# Patient Record
Sex: MALE
[De-identification: names, ages, dates, MRNs, and addresses within clinical notes are randomized per-mention and may not be internally consistent; named-entity substitution may affect disease eponyms.]

---

## 2018-09-01 ENCOUNTER — HOSPITAL ENCOUNTER (EMERGENCY)
Dept: HOSPITAL 97 - ER | Age: 28
LOS: 1 days | Discharge: HOME | End: 2018-09-02
Payer: SELF-PAY

## 2018-09-01 DIAGNOSIS — K29.71: Primary | ICD-10-CM

## 2018-09-01 DIAGNOSIS — Z88.1: ICD-10-CM

## 2018-09-01 LAB
HCT VFR BLD CALC: 45.7 % (ref 39.6–49)
INR BLD: 0.95
LYMPHOCYTES # SPEC AUTO: 1.3 K/UL (ref 0.7–4.9)
MCH RBC QN AUTO: 27.2 PG (ref 27–35)
MCV RBC: 80.5 FL (ref 80–100)
PMV BLD: 11.2 FL (ref 7.6–11.3)
RBC # BLD: 5.68 M/UL (ref 4.33–5.43)

## 2018-09-01 PROCEDURE — 80329 ANALGESICS NON-OPIOID 1 OR 2: CPT

## 2018-09-01 PROCEDURE — 83690 ASSAY OF LIPASE: CPT

## 2018-09-01 PROCEDURE — 36415 COLL VENOUS BLD VENIPUNCTURE: CPT

## 2018-09-01 PROCEDURE — 81015 MICROSCOPIC EXAM OF URINE: CPT

## 2018-09-01 PROCEDURE — 80076 HEPATIC FUNCTION PANEL: CPT

## 2018-09-01 PROCEDURE — 80048 BASIC METABOLIC PNL TOTAL CA: CPT

## 2018-09-01 PROCEDURE — 99284 EMERGENCY DEPT VISIT MOD MDM: CPT

## 2018-09-01 PROCEDURE — 80320 DRUG SCREEN QUANTALCOHOLS: CPT

## 2018-09-01 PROCEDURE — 82150 ASSAY OF AMYLASE: CPT

## 2018-09-01 PROCEDURE — 85025 COMPLETE CBC W/AUTO DIFF WBC: CPT

## 2018-09-01 PROCEDURE — 85610 PROTHROMBIN TIME: CPT

## 2018-09-01 PROCEDURE — 93005 ELECTROCARDIOGRAM TRACING: CPT

## 2018-09-01 PROCEDURE — 81003 URINALYSIS AUTO W/O SCOPE: CPT

## 2018-09-01 PROCEDURE — 96374 THER/PROPH/DIAG INJ IV PUSH: CPT

## 2018-09-01 PROCEDURE — 80307 DRUG TEST PRSMV CHEM ANLYZR: CPT

## 2018-09-01 PROCEDURE — 85730 THROMBOPLASTIN TIME PARTIAL: CPT

## 2018-09-02 VITALS — TEMPERATURE: 98.5 F | OXYGEN SATURATION: 98 %

## 2018-09-02 VITALS — SYSTOLIC BLOOD PRESSURE: 134 MMHG | DIASTOLIC BLOOD PRESSURE: 81 MMHG

## 2018-09-02 LAB
ALBUMIN SERPL BCP-MCNC: 3.9 G/DL (ref 3.4–5)
ALP SERPL-CCNC: 108 U/L (ref 45–117)
ALT SERPL W P-5'-P-CCNC: 31 U/L (ref 12–78)
AMYLASE SERPL-CCNC: 72 U/L (ref 25–115)
AST SERPL W P-5'-P-CCNC: 20 U/L (ref 15–37)
BUN BLD-MCNC: 14 MG/DL (ref 7–18)
GLUCOSE SERPLBLD-MCNC: 128 MG/DL (ref 74–106)
LIPASE SERPL-CCNC: 200 U/L (ref 73–393)
METHAMPHET UR QL SCN: NEGATIVE
POTASSIUM SERPL-SCNC: 3.7 MMOL/L (ref 3.5–5.1)
THC SERPL-MCNC: POSITIVE NG/ML
UA COMPLETE W REFLEX CULTURE PNL UR: (no result)

## 2018-09-02 NOTE — ER
Nurse's Notes                                                                                     

 [facility]                                                                

Name: Max Westbrook                                                                                   

Age: 28 yrs                                                                                       

Sex: Male                                                                                         

: 1990                                                                                   

MRN: [id number]                                                                                   

Arrival Date: 2018                                                                          

Time: 23:06                                                                                       

Account#: [id number]                                                                            

Bed 3                                                                                             

Private MD:                                                                                       

Diagnosis: Gastritis, unspecified, with bleeding                                                  

                                                                                                  

Presentation:                                                                                     

                                                                                             

23:08 Presenting complaint: Presenting complaint: EMS states: Pt reports doing cocaine and    tl2 

      marijuana tonight, was arrested and began vomiting after brought into custody. EMS          

      reports that emesis was bright red. 12.5 mg Phenergan given per EMS, Pt denies nausea       

      at this time.                                                                               

23:10 Transition of care: patient was not received from another setting of care. Onset of     tl2 

      symptoms was 2018 at 22:30. Risk Assessment: Do you want to hurt yourself     

      or someone else? Patient reports no desire to harm self or others. Initial Sepsis           

      Screen: Does the patient meet any 2 criteria? No. Patient's initial sepsis screen is        

      negative. Does the patient have a suspected source of infection? No. Patient's initial      

      sepsis screen is negative. Care prior to arrival: Medication(s) given: Phenergan, 12.5      

      mg, IV initiated. 20 GA, in the right antecubital area.                                     

23:10 Method Of Arrival: EMS: [facility]                                                tl2 

23:10 Acuity: EMMETT 3                                                                           tl2 

                                                                                                  

Triage Assessment:                                                                                

23:12 General: Appears in no apparent distress. comfortable, Behavior is calm, cooperative,   tl2 

      appropriate for age. Pain: Denies pain. Neuro: Level of Consciousness is awake, alert,      

      obeys commands, Oriented to person, place, time, situation. Cardiovascular: Denies          

      chest pain. Respiratory: Airway is patent Respiratory effort is even, unlabored,            

      Respiratory pattern is regular, symmetrical. GI: Reports nausea, vomiting. : No signs     

      and/or symptoms were reported regarding the genitourinary system. Derm: Skin is pink,       

      warm \T\ dry.                                                                               

                                                                                                  

Historical:                                                                                       

- Allergies:                                                                                      

23:12 Augmentin;                                                                              tl2 

- Home Meds:                                                                                      

23:12 None [Active];                                                                          tl2 

- PMHx:                                                                                           

23:12 drug induced seizures;                                                                  tl2 

                                                                                                  

- Immunization history:: Adult Immunizations up to date.                                          

- Social history:: Smoking status: Patient/guardian denies using tobacco, Patient uses            

  alcohol, on a daily basis. street drugs, cocaine, marijuana.                                    

- Ebola Screening: : No symptoms or risks identified at this time.                                

                                                                                                  

                                                                                                  

Screenin:14 Abuse screen: Denies threats or abuse. Nutritional screening: No deficits noted.        tl2 

      Tuberculosis screening: No symptoms or risk factors identified. Fall Risk None              

      identified.                                                                                 

                                                                                                  

Assessment:                                                                                       

23:30 General: see triage assessment.                                                         tl2 

                                                                                             

00:57 Reassessment: Patient appears in no apparent distress at this time. Patient and/or      tl2 

      family updated on plan of care and expected duration. Pain level reassessed. Patient is     

      alert, oriented x 3, equal unlabored respirations, skin warm/dry/pink. Awaiting dispo,      

      Pt continues to deny any nausea. No vomiting since before arrival.                          

01:36 Reassessment: Patient appears in no apparent distress at this time. Patient and/or      tl2 

      family updated on plan of care and expected duration. Pain level reassessed. Patient is     

      alert, oriented x 3, equal unlabored respirations, skin warm/dry/pink. Pt verbalized        

      understanding of discharge instructions, need for follow up and prescription usage. Pt      

      escorted out of ER by [facility].                                                                 

                                                                                                  

Vital Signs:                                                                                      

                                                                                             

23:12  / 94; Pulse 108; Resp 20; Temp 98.5(O); Pulse Ox 98% on R/A; Weight 113.4 kg;    tl2 

      Height 5 ft. 9 in. (175.26 cm); Pain 0/10;                                                  

                                                                                             

00:09  / 87; Pulse 82; Resp 18; Pulse Ox 98% on R/A;                                    tl2 

00:57  / 90; Pulse 78; Resp 18; Pulse Ox 98% on R/A;                                    tl2 

01:06  / 81; Pulse 78; Resp 16; Pulse Ox 98% on R/A;                                    tl1 

                                                                                             

23:12 Body Mass Index 36.92 (113.40 kg, 175.26 cm)                                            tl2 

                                                                                                  

ED Course:                                                                                        

                                                                                             

23:06 Patient arrived in ED.                                                                  sb2 

23:07 Willem Kenyon MD is Attending Physician.                                            tw4 

23:11 Triage completed.                                                                       tl2 

23:12 Arm band placed on right wrist.                                                         tl2 

23:14 Patient has correct armband on for positive identification. Bed in low position. Call   tl2 

      light in reach. Side rails up X2. Security at bedside.                                      

23:14 Maintain EMS IV. Dressing intact. Good blood return noted. Site clean \T\ dry. Gauge \T\    tl
2

      site: 20 g R AC.                                                                            

                                                                                             

01:36 No provider procedures requiring assistance completed. IV discontinued, intact,         tl2 

      bleeding controlled, No redness/swelling at site. Pressure dressing applied.                

                                                                                                  

Administered Medications:                                                                         

                                                                                             

23:30 Drug: ProTONIX 40 mg Route: IVP; Site: right antecubital;                               tl2 

                                                                                             

01:37 Follow up: Response: No adverse reaction                                                tl2 

                                                                                                  

                                                                                                  

Outcome:                                                                                          

01:31 Discharge ordered by MD. [email or web address] 

01:36 Discharged to Law Enforcement                                                           tl2 

01:36 Condition: stable                                                                           

01:36 Discharge instructions given to patient, police, Instructed on discharge instructions,      

      follow up and referral plans. medication usage.                                             

01:45 Patient left the ED.                                                                    tl1 

                                                                                                  

Signatures:                                                                                       

Ludivina Griffin RN                      RN   tl1                                                  

Imelda Burden RN                        RN   tl2                                                  

Willem Kenyon MD MD   tw4                                                  

Rosario Rivera                               sb2                                                  

                                                                                                  

Corrections: (The following items were deleted from the chart)                                    

                                                                                             

23:11 23:08 Presenting complaint: tl2                                                         tl2 

                                                                                             

01:07 00:57 Reassessment: Patient appears in no apparent distress at this time. Patient       tl2 

      and/or family updated on plan of care and expected duration. Pain level reassessed.         

      Patient is alert, oriented x 3, equal unlabored respirations, skin warm/dry/pink.           

      Awaiting dispo tl2                                                                          

                                                                                                  

**************************************************************************************************

## 2018-09-02 NOTE — EDPHYS
Physician Documentation                                                                           

 [facility]                                                                

Name: Max Westbrook                                                                                   

Age: 28 yrs                                                                                       

Sex: Male                                                                                         

: 1990                                                                                   

MRN: [id number]                                                                                   

Arrival Date: 2018                                                                          

Time: 23:06                                                                                       

Account#: [id number]                                                                            

Bed 3                                                                                             

Private MD:                                                                                       

ED Physician Willem Kenyon                                                                     

HPI:                                                                                              

                                                                                             

01:25 This 28 yrs old  Male presents to ER via EMS with complaints of Nausea/Vomiting.tw4 

01:25 The patient presents to the emergency department with nausea, that is mild, vomiting,   tw4 

      15 times today, described as blood streaked. Onset: The symptoms/episode began/occurred     

      today. Possible causes: unknown. The symptoms are aggravated by nothing. The symptoms       

      are alleviated by nothing. Associated signs and symptoms: The patient has no apparent       

      associated signs or symptoms. Severity of symptoms: At their worst the symptoms were        

      moderate. The patient has not experienced similar symptoms in the past.                     

                                                                                                  

Historical:                                                                                       

- Allergies:                                                                                      

                                                                                             

23:12 Augmentin;                                                                              tl2 

- Home Meds:                                                                                      

23:12 None [Active];                                                                          tl2 

- PMHx:                                                                                           

23:12 drug induced seizures;                                                                  tl2 

                                                                                                  

- Immunization history:: Adult Immunizations up to date.                                          

- Social history:: Smoking status: Patient/guardian denies using tobacco, Patient uses            

  alcohol, on a daily basis. street drugs, cocaine, marijuana.                                    

- Ebola Screening: : No symptoms or risks identified at this time.                                

                                                                                                  

                                                                                                  

ROS:                                                                                              

                                                                                             

01:25 Constitutional: Negative for fever, chills, and weight loss, Cardiovascular: Negative   tw4 

      for chest pain, palpitations, and edema, Respiratory: Negative for shortness of breath,     

      cough, wheezing, and pleuritic chest pain, MS/Extremity: Negative for injury and            

      deformity, Skin: Negative for injury, rash, and discoloration, Neuro: Negative for          

      headache, weakness, numbness, tingling, and seizure.                                        

      Abdomen/GI: Positive for nausea and vomiting, nausea, vomiting, and diarrhea, nausea,       

      hematemesis, Negative for black/tarry stool, rectal pain, rectal bleeding.                  

                                                                                                  

Exam:                                                                                             

01:25 Constitutional:  This is a well developed, well nourished patient who is awake, alert,  tw4 

      and in no acute distress. Head/Face:  Normocephalic, atraumatic. Chest/axilla:  Normal      

      chest wall appearance and motion.  Nontender with no deformity.  No lesions are             

      appreciated. Cardiovascular:  Regular rate and rhythm with a normal S1 and S2.  No          

      gallops, murmurs, or rubs.  Normal PMI, no JVD.  No pulse deficits. Respiratory:  Lungs     

      have equal breath sounds bilaterally, clear to auscultation and percussion.  No rales,      

      rhonchi or wheezes noted.  No increased work of breathing, no retractions or nasal          

      flaring. Back:  No spinal tenderness.  No costovertebral tenderness.  Full range of         

      motion. MS/ Extremity:  Pulses equal, no cyanosis.  Neurovascular intact.  Full, normal     

      range of motion. Neuro:  Awake and alert, GCS 15, oriented to person, place, time, and      

      situation.  Cranial nerves II-XII grossly intact.  Motor strength 5/5 in all                

      extremities.  Sensory grossly intact.  Cerebellar exam normal.  Normal gait. Psych:         

      Awake, alert, with orientation to person, place and time.  Behavior, mood, and affect       

      are within normal limits.                                                                   

01:25 Abdomen/GI: Inspection: abdomen appears normal, Bowel sounds: normal, Palpation:            

      moderate abdominal tenderness, in all quadrants, in the epigastric area.                    

                                                                                                  

Vital Signs:                                                                                      

                                                                                             

23:12  / 94; Pulse 108; Resp 20; Temp 98.5(O); Pulse Ox 98% on R/A; Weight 113.4 kg;    tl2 

      Height 5 ft. 9 in. (175.26 cm); Pain 0/10;                                                  

                                                                                             

00:09  / 87; Pulse 82; Resp 18; Pulse Ox 98% on R/A;                                    tl2 

00:57  / 90; Pulse 78; Resp 18; Pulse Ox 98% on R/A;                                    tl2 

01:06  / 81; Pulse 78; Resp 16; Pulse Ox 98% on R/A;                                    tl1 

                                                                                             

23:12 Body Mass Index 36.92 (113.40 kg, 175.26 cm)                                            tl2 

                                                                                                  

MDM:                                                                                              

                                                                                             

23:07 Patient medically screened.                                                             tw4 

                                                                                             

01:25 Differential diagnosis: Nonspecific abd pain, gastritis. Data reviewed: vital signs,    tw4 

      nurses notes. Counseling: I had a detailed discussion with the patient and/or guardian      

      regarding: the historical points, exam findings, and any diagnostic results supporting      

      the discharge/admit diagnosis. Medication response: Zofran relieved the patient's           

      nausea. PROTONIX. Response to treatment: and as a result, I will discharge patient.         

      Special discussion: I discussed with the patient/guardian in detail that at this point      

      there is no indication for admission to the hospital. It is understood, however, that       

      if the symptoms persist or worsen the patient needs to return immediately for               

      re-evaluation. Special discussion: Based on the patient's Hx, exam, and Dx evaluation,      

      there is no indication for emergent surgery or inpatient Tx. It is understood by the        

      patient/guardian that if the Sx's persist or worsen they need to return immediately for     

      re-evaluation. ED course: PT TOLERATED PO FLUIDS, NO EVIDENCE OF GI BLEED IN ED.            

                                                                                                  

                                                                                             

23:09 Order name: Acetaminophen; Complete Time: :                                                                                                                                      

01:21 Interpretation: Within normal limits: ACETA < 2.0.                                                                                                                                   

23:09 Order name: BMP; Complete Time: :                                                                                                                                                

01:21 Interpretation: Normal except: GLUC 128; GFR 67.                                        [facility] 

                                                                                             

23:09 Order name: CBC with Diff; Complete Time: :                                                                                                                                      

01:22 Interpretation: Normal except: RBC 5.68; LYM% 15.1.                                                                                                                                  

23:09 Order name: Ethanol; Complete Time: :                                                                                                                                            

01:23 Interpretation: Within normal limits: ETOH < 3.                                                                                                                                      

23:09 Order name: Hepatic Function; Complete Time: :                                                                                                                                   

01:22 Interpretation: Normal except: GLOB 4.3; A/G 0.9.                                       [facility] 

                                                                                             

23:09 Order name: Protime (+inr); Complete Time: :                                                                                                                                     

01:23 Interpretation: Within normal limits: PT 11.2.                                                                                                                                       

23:09 Order name: Ptt, Activated; Complete Time: :                                                                                                                                     

01:24 Interpretation: Within normal limits: PTT 27.3.                                                                                                                                      

23:09 Order name: Salicylate; Complete Time: :                                                                                             

01:23 Interpretation: Normal except: SAL < 1.7.                                                                                                                                            

23:09 Order name: Urine Drug Screen                                                                                                                                                        

23:09 Order name: Amylase, Serum; Complete Time: :                                        [facility] 

                                                                                             

01:24 Interpretation: Within normal limits: ARTIE 72.                                           4 

                                                                                             

23:09 Order name: Creatinine for Radiology; Complete Time: 01:21                              4 

                                                                                             

01:23 Interpretation: Normal except: CRE 1.40.                                                4 

                                                                                             

23:09 Order name: EKG; Complete Time: 23:10                                                   2 

                                                                                             

23:09 Order name: EKG - Nurse/Tech; Complete Time: 23:32                                      2 

                                                                                             

23:09 Order name: IV Saline Lock; Complete Time: 23:24                                        2 

                                                                                             

23:09 Order name: Labs collected and sent; Complete Time: 23:24                               2 

                                                                                             

23:09 Order name: O2 Per Protocol; Complete Time: 23:24                                       2 

                                                                                             

23:09 Order name: O2 Sat Monitoring; Complete Time: 23:24                                     2 

                                                                                             

23:09 Order name: Urine Dipstick-Ancillary (obtain specimen); Complete Time: 00:21            [facility] 

                                                                                             

23:09 Order name: Lipase; Complete Time: 01:21                                                [facility] 

                                                                                             

01:24 Interpretation: Within normal limits: .                                          [facility] 

                                                                                             

23:09 Order name: Urine Microscopic Only                                                       

                                                                                             

23:09 Order name: IV Saline Lock; Complete Time: 23:25                                        [facility] 

                                                                                             

23:09 Order name: Labs collected and sent; Complete Time: 23:25                               [facility] 

                                                                                             

23:09 Order name: Urine Dipstick-Ancillary (obtain specimen); Complete Time: 00:21            [facility] 

                                                                                             

23:09 Order name: Ptt, Activated                                                               

                                                                                             

00:27 Order name: Urine Dipstick--Ancillary (enter results)                                   ms  

                                                                                                  

Administered Medications:                                                                         

                                                                                             

23:30 Drug: ProTONIX 40 mg Route: IVP; Site: right antecubital;                               tl2 

                                                                                             

01:37 Follow up: Response: No adverse reaction                                                2 

                                                                                                  

                                                                                                  

Disposition:                                                                                      

18 01:31 Discharged to Home. Impression: Gastritis, unspecified, with bleeding.             

- Condition is Stable.                                                                            

- Discharge Instructions: Gastritis, Adult, Gastritis, Adult, Easy-to-Read.                       

- Prescriptions for Protonix 40 mg Oral Tablet - take 1 tablet by ORAL route once                 

  daily; 30 tablet.                                                                               

- Medication Reconciliation Form, Thank You Letter, Antibiotic Education, Prescription            

  Opioid Use form.                                                                                

- Follow up: Private Physician; When: Upon discharge from the Emergency Department;               

  Reason: Further diagnostic work-up, Recheck today's complaints, Re-evaluation by your           

  physician.                                                                                      

- Problem is new.                                                                                 

- Symptoms have improved.                                                                         

                                                                                                  

                                                                                                  

                                                                                                  

Signatures:                                                                                       

Dispatcher [employer]                           [facility]                                                 

Ludivina Griffin, RN                      RN   tl1                                                  

Imelda Burden RN                        RN   tl2                                                  

Willem Kenyon MD MD   tw4                                                  

                                                                                                  

Corrections: (The following items were deleted from the chart)                                    

                                                                                             

23:11 23:10 BASIC METABOLIC PANEL+C.LAB.BRZ ordered. [facility]

: 23:10 CBC+H.LAB.BRZ ordered. [facility]

: 23:10 HEPATIC FUNCTION+C.LAB.BRZ ordered. [facility]

: 23:10 PROTIME (+INR)+COAG.LAB.BRZ ordered. [facility]

                                                                                             

01:45 01:31 2018 01:31 Discharged to Home. Impression: Gastritis, unspecified, with     tl1 

      bleeding. Condition is Stable. Forms are Medication Reconciliation Form, Thank You          

      Letter, Antibiotic Education, Prescription Opioid Use. Follow up: Private Physician;        

      When: Upon discharge from the Emergency Department; Reason: Further diagnostic work-up,     

      Recheck today's complaints, Re-evaluation by your physician. Problem is new. Symptoms       

      have improved. tw4                                                                          

                                                                                                  

**************************************************************************************************

## 2018-09-02 NOTE — EKG
Test Date:    2018-09-01               Test Time:    23:29:33

Technician:   [facility]                                    

                                                     

MEASUREMENT RESULTS:                                       

Intervals:                                           

Rate:         90                                     

IN:           146                                    

QRSD:         96                                     

QT:           354                                    

QTc:          433                                    

Axis:                                                

P:            51                                     

IN:           146                                    

QRS:          -12                                    

T:            36                                     

                                                     

INTERPRETIVE STATEMENTS:                                       

                                                     

Normal sinus rhythm

Normal ECG

No previous ECG available for comparison



Electronically Signed On 09-02-18 08:36:22 CDT by Geo Rodriguez

## 2019-08-17 ENCOUNTER — HOSPITAL ENCOUNTER (EMERGENCY)
Dept: HOSPITAL 97 - ER | Age: 29
Discharge: HOME | End: 2019-08-17
Payer: SELF-PAY

## 2019-08-17 VITALS — DIASTOLIC BLOOD PRESSURE: 73 MMHG | SYSTOLIC BLOOD PRESSURE: 123 MMHG | TEMPERATURE: 97.9 F | OXYGEN SATURATION: 98 %

## 2019-08-17 DIAGNOSIS — F17.210: ICD-10-CM

## 2019-08-17 DIAGNOSIS — Z88.1: ICD-10-CM

## 2019-08-17 DIAGNOSIS — R60.9: Primary | ICD-10-CM

## 2019-08-17 LAB
BUN BLD-MCNC: 17 MG/DL (ref 7–18)
GLUCOSE SERPLBLD-MCNC: 95 MG/DL (ref 74–106)
HCT VFR BLD CALC: 40 % (ref 39.6–49)
LYMPHOCYTES # SPEC AUTO: 1.6 K/UL (ref 0.7–4.9)
METHAMPHET UR QL SCN: NEGATIVE
NT-PROBNP SERPL-MCNC: 159 PG/ML (ref ?–125)
PMV BLD: 11 FL (ref 7.6–11.3)
POTASSIUM SERPL-SCNC: 3.6 MMOL/L (ref 3.5–5.1)
RBC # BLD: 4.9 M/UL (ref 4.33–5.43)
THC SERPL-MCNC: NEGATIVE NG/ML
TROPONIN (EMERG DEPT USE ONLY): < 0.02 NG/ML (ref 0–0.04)
UA COMPLETE W REFLEX CULTURE PNL UR: (no result)

## 2019-08-17 PROCEDURE — 80307 DRUG TEST PRSMV CHEM ANLYZR: CPT

## 2019-08-17 PROCEDURE — 99284 EMERGENCY DEPT VISIT MOD MDM: CPT

## 2019-08-17 PROCEDURE — 71046 X-RAY EXAM CHEST 2 VIEWS: CPT

## 2019-08-17 PROCEDURE — 81003 URINALYSIS AUTO W/O SCOPE: CPT

## 2019-08-17 PROCEDURE — 84484 ASSAY OF TROPONIN QUANT: CPT

## 2019-08-17 PROCEDURE — 36415 COLL VENOUS BLD VENIPUNCTURE: CPT

## 2019-08-17 PROCEDURE — 83880 ASSAY OF NATRIURETIC PEPTIDE: CPT

## 2019-08-17 PROCEDURE — 93005 ELECTROCARDIOGRAM TRACING: CPT

## 2019-08-17 PROCEDURE — 85025 COMPLETE CBC W/AUTO DIFF WBC: CPT

## 2019-08-17 PROCEDURE — 80048 BASIC METABOLIC PNL TOTAL CA: CPT

## 2019-08-17 PROCEDURE — 93970 EXTREMITY STUDY: CPT

## 2019-08-17 PROCEDURE — 81015 MICROSCOPIC EXAM OF URINE: CPT

## 2019-08-17 PROCEDURE — 82550 ASSAY OF CK (CPK): CPT

## 2019-08-17 NOTE — EDPHYS
Physician Documentation                                                                           

 [facility]                                                                 

Name: Max Westbrook                                                                                   

Age: 29 yrs                                                                                       

Sex: Male                                                                                         

: 1990                                                                                   

MRN: [id number]                                                                                   

Arrival Date: 2019                                                                          

Time: 16:47                                                                                       

Account#: [id number]                                                                            

Bed 13                                                                                            

Private MD: None, None                                                                            

ED Physician Ralph Mckee                                                                       

HPI:                                                                                              

                                                                                             

17:23 This 29 yrs old  Male presents to ER via Ambulatory with complaints of Feet     snw 

      Swelling.                                                                                   

17:23 The patient presents with swelling. The complaints affect the right foot and right      snw 

      ankle and left foot and left ankle. Context: The problem was sustained at home,             

      resulted from an unknown cause, the patient can fully bear weight, the patient is able      

      to ambulate. Onset: The symptoms/episode began/occurred gradually, 3 day(s) ago, and        

      became persistent. Associated signs and symptoms: Pertinent positives: shortness of         

      breath. Severity of symptoms: At their worst the symptoms were moderate. The patient        

      has not experienced similar symptoms in the past.                                           

                                                                                                  

Historical:                                                                                       

- Allergies:                                                                                      

16:57 Augmentin;                                                                              hb  

- PMHx:                                                                                           

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  

                                                                                                  

- Immunization history:: Adult Immunizations up to date.                                          

- Social history:: Smoking status: Patient uses tobacco products, smokes one pack                 

  cigarettes per day.                                                                             

- Ebola Screening: : No symptoms or risks identified at this time.                                

                                                                                                  

                                                                                                  

ROS:                                                                                              

17:23 Constitutional: Negative for fever, chills, and weight loss, Eyes: Negative for injury, snw 

      pain, redness, and discharge, ENT: Negative for injury, pain, and discharge, Neck:          

      Negative for injury, pain, and swelling, Cardiovascular: Negative for chest pain,           

      palpitations, and edema, Respiratory: Negative for cough, wheezing, and pleuritic chest     

      pain, + shortness of breath Abdomen/GI: Negative for abdominal pain, nausea, vomiting,      

      diarrhea, and constipation, Back: Negative for injury and pain, : Negative for            

      injury, bleeding, discharge, and swelling, Skin: Negative for injury, rash, and             

      discoloration, Neuro: Negative for headache, weakness, numbness, tingling, and seizure.     

17:23 MS/extremity: Positive for swelling, of the right ankle and left ankle.                     

                                                                                                  

Exam:                                                                                             

17:23 Head/Face:  Normocephalic, atraumatic.                                                  snw 

17:23 Eyes:  Pupils equal round and reactive to light, extra-ocular motions intact.  Lids and     

      lashes normal.  Conjunctiva and sclera are non-icteric and not injected.  Cornea within     

      normal limits.  Periorbital areas with no swelling, redness, or edema. ENT:  Nares          

      patent. No nasal discharge, no septal abnormalities noted.  Tympanic membranes are          

      normal and external auditory canals are clear.  Oropharynx with no redness, swelling,       

      or masses, exudates, or evidence of obstruction, uvula midline.  Mucous membranes           

      moist. Neck:  Trachea midline, no thyromegaly or masses palpated, and no cervical           

      lymphadenopathy.  Supple, full range of motion without nuchal rigidity, or vertebral        

      point tenderness.  No Meningismus. Chest/axilla:  Normal chest wall appearance and          

      motion.  Nontender with no deformity.  No lesions are appreciated.                          

17:23 Respiratory:  Lungs have equal breath sounds bilaterally, clear to auscultation and         

      percussion.  No rales, rhonchi or wheezes noted.  No increased work of breathing, no        

      retractions or nasal flaring. Abdomen/GI:  Soft, non-tender, with normal bowel sounds.      

      No distension or tympany.  No guarding or rebound.  No evidence of tenderness               

      throughout. Back:  No spinal tenderness.  No costovertebral tenderness.  Full range of      

      motion. Skin:  Warm, dry with normal turgor.  Normal color with no rashes, no lesions,      

      and no evidence of cellulitis. MS/ Extremity:  Pulses equal, no cyanosis.                   

      Neurovascular intact.  Full, normal range of motion. Neuro:  Awake and alert, GCS 15,       

      oriented to person, place, time, and situation.  Cranial nerves II-XII grossly intact.      

      Motor strength 5/5 in all extremities.  Sensory grossly intact.  Cerebellar exam            

      normal.  Normal gait.                                                                       

17:23 Constitutional: The patient appears alert, awake, obese.                                    

17:23 Cardiovascular: Rate: bradycardic, Heart sounds: normal.                                    

17:23 Cardiovascular: Edema: 3+ edema to level of left ankle, left foot, right ankle and          

      right foot.                                                                                 

18:19 ECG was reviewed by the Attending Physician.                                            cp  

                                                                                                  

Vital Signs:                                                                                      

16:57  / 83; Pulse 59; Resp 16; Temp 97.7; Pulse Ox 100% on R/A; Weight 113.4 kg;       hb  

      Height 5 ft. 8 in. (172.72 cm); Pain 7/10;                                                  

18:19  / 74; Pulse 52; Resp 16 S; Pulse Ox 99% on R/A;                                  ca1 

19:19  / 73; Pulse 56; Resp 16; Temp 97.9; Pulse Ox 98% on R/A; Pain 4/10;              aa1 

16:57 Body Mass Index 38.01 (113.40 kg, 172.72 cm)                                            hb  

                                                                                                  

MDM:                                                                                              

17:20 Patient medically screened.                                                             snw 

18:13 Data reviewed: vital signs, nurses notes, lab test result(s), EKG, radiologic studies.  snw 

      Data interpreted: Pulse oximetry: on room air is 100 %. Transition of care: After a         

      detail discussion of the patient's case, care is transferred to [facility].              

                                                                                                  

                                                                                             

17:01 Order name: Urine Microscopic Only; Complete Time: 07:05                                snw 

                                                                                             

17:29 Order name: UDS; Complete Time: 07:05                                                   snw 

                                                                                             

17:29 Order name: CBC with Diff; Complete Time: 18:12                                         snw 

                                                                                             

18:56 Interpretation: Normal except: HGB 12.9; MCH 26.4; EOSINOPHIL % 4.8.                    cp  

                                                                                             

17:29 Order name: Chem 7; Complete Time: 18:56                                                snw 

                                                                                             

18:56 Interpretation: Normal except: ; GFR 71.                                          cp  

                                                                                             

17:29 Order name: BNP; Complete Time: 18:56                                                   snw 

                                                                                             

18:56 Interpretation: Abnormal: NT PRO-.                                               cp  

                                                                                             

17:29 Order name: Troponin (emerg Dept Use Only); Complete Time: 18:56                        snw 

                                                                                             

17:01 Order name: Urine Dipstick-Ancillary (obtain specimen); Complete Time: 19:19            snw 

                                                                                             

17:01 Order name: Chest Pa And Lat (2 Views) XRAY; Complete Time: 18:56                       snw 

                                                                                             

17:01 Order name: EKG; Complete Time: 17:02                                                   snw 

                                                                                             

17:01 Order name: EKG - Nurse/Tech; Complete Time: 17:54                                      snw 

                                                                                             

17:28 Order name: US Extremity Venous W Compression Michael; Complete Time: 18:56                 snw 

                                                                                             

17:29 Order name: CPK; Complete Time: 18:56                                                   snw 

                                                                                             

19:04 Order name: Urine Dipstick--Ancillary (enter results); Complete Time: 07:05             ar5 

                                                                                                  

EC:19 Rate is 50 beats/min. Rhythm is regular. NV interval is normal. QRS interval is         cp  

      prolonged at 112 msec. QT interval is normal. Interpreted by me. Reviewed by me.            

                                                                                                  

Administered Medications:                                                                         

18:16 Drug: LaSIX 40 mg Route: PO;                                                            ca1 

                                                                                                  

                                                                                                  

Disposition:                                                                                      

19 19:16 Discharged to Home. Impression: Edema, unspecified.                                

- Condition is Stable.                                                                            

- Discharge Instructions: Fat and Cholesterol Restricted Diet, Peripheral Edema, Edema.           

- Prescriptions for Hydrochlorothiazide 25 mg Oral Tablet - take 1 tablet by ORAL route           

  once daily .; 30 tablet.                                                                        

- Work release form, Medication Reconciliation Form, Thank You Letter, Antibiotic                 

  Education, Prescription Opioid Use form.                                                        

- Follow up: Private Physician; When: 2 - 3 days; Reason: Recheck today's complaints,             

  Continuance of care, Re-evaluation by your physician. Follow up: Emergency                      

  Department; When: As needed; Reason: Worsening of condition.                                    

- Problem is new.                                                                                 

- Symptoms have improved.                                                                         

                                                                                                  

                                                                                                  

                                                                                                  

Signatures:                                                                                       

Dispatcher [employer]                           EDMS                                                 

Shannon Rendon RN                  RN   aa1                                                  

Dania Faulkner, FNP-C                 FNP-Csnw                                                  

[place] [facility] PA PA   cp                                                   

Marisol Gallegos RN RN                                                      

Brigida Pascual RN                        RN   ca1                                                  

                                                                                                  

Corrections: (The following items were deleted from the chart)                                    

19:34 19:16 2019 19:16 Discharged to Home. Impression: Edema, unspecified. Condition is aa1 

      Stable. Discharge Instructions: Fat and Cholesterol Restricted Diet, Peripheral Edema.      

      Prescriptions for Hydrochlorothiazide 25 mg Oral Tablet - take 1 tablet by ORAL route       

      once daily .; 30 tablet. and Forms are Medication Reconciliation Form, Thank You            

      Letter, Antibiotic Education, Prescription Opioid Use. Follow up: Private Physician;        

      When: 2 - 3 days; Reason: Recheck today's complaints, Continuance of care,                  

      Re-evaluation by your physician. Follow up: Emergency Department; When: As needed;          

      Reason: Worsening of condition. Problem is new. Symptoms have improved. cp                  

                                                                                                  

**************************************************************************************************

## 2019-08-17 NOTE — RAD REPORT
EXAM DESCRIPTION:  RAD - Chest Pa And Lat (2 Views) - 8/17/2019 6:03 pm

 

CLINICAL HISTORY:  COUGH

Chest pain.

 

COMPARISON:  No comparisons

 

FINDINGS:  The lungs are clear. The heart is normal in size. No displaced fractures.

 

IMPRESSION:  No acute or concerning finding suspected.

## 2019-08-17 NOTE — RAD REPORT
EXAM DESCRIPTION:  US - Extrem Venous W Compress Michael - 8/17/2019 6:20 pm

 

CLINICAL HISTORY:  PAIN

Bilateral leg edema and swelling.

 

COMPARISON:  No comparisons

 

TECHNIQUE:  Real-time sonographic interrogation of the left and right lower extremity deep venous sys
tems was performed.

 

FINDINGS:  Normal compressibility, flow augmentation, phasic flow and spontaneous flow is identified 
in both the left and right lower extremity deep venous systems.

 

IMPRESSION:  No sonographic evidence of left or right lower extremity deep venous thrombosis.

## 2019-08-17 NOTE — ER
Nurse's Notes                                                                                     

 [facility]                                                                 

Name: Max Westbrook                                                                                   

Age: 29 yrs                                                                                       

Sex: Male                                                                                         

: 1990                                                                                   

MRN: [id number]                                                                                   

Arrival Date: 2019                                                                          

Time: 16:47                                                                                       

Account#: [id number]                                                                            

Bed 13                                                                                            

Private MD: None, None                                                                            

Diagnosis: Edema, unspecified                                                                     

                                                                                                  

Presentation:                                                                                     

                                                                                             

16:56 Presenting complaint: Bilateral ankle and foot swelling x 3 days. Transition of care:   hb  

      patient was not received from another setting of care. Onset of symptoms was 2019. Risk Assessment: Do you want to hurt yourself or someone else? Patient reports no     

      desire to harm self or others. Initial Sepsis Screen: Does the patient meet any 2           

      criteria? No. Patient's initial sepsis screen is negative. Does the patient have a          

      suspected source of infection? No. Patient's initial sepsis screen is negative. Care        

      prior to arrival: None.                                                                     

16:56 Method Of Arrival: Ambulatory                                                             

16:56 Acuity: EMMETT 3                                                                           hb  

                                                                                                  

Historical:                                                                                       

- Allergies:                                                                                      

16:57 Augmentin;                                                                              hb  

- PMHx:                                                                                           

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  

                                                                                                  

- Immunization history:: Adult Immunizations up to date.                                          

- Social history:: Smoking status: Patient uses tobacco products, smokes one pack                 

  cigarettes per day.                                                                             

- Ebola Screening: : No symptoms or risks identified at this time.                                

                                                                                                  

                                                                                                  

Screenin:10 Abuse screen: Denies threats or abuse. Denies injuries from another. Nutritional        ca1 

      screening: No deficits noted. Tuberculosis screening: No symptoms or risk factors           

      identified. Fall Risk IV access (20 points).                                                

                                                                                                  

Assessment:                                                                                       

17:10 General: Appears in no apparent distress. comfortable, Behavior is calm, cooperative,   ca1 

      appropriate for age. Pain: Complains of pain in right foot and right ankle and left         

      foot and left ankle Pain currently is 6 out of 10 on a pain scale. Pain began 2-3 days      

      ago. Neuro: Level of Consciousness is awake, alert, obeys commands, Oriented to person,     

      place, time, situation. Cardiovascular: Heart tones S1 S2 present Capillary refill < 3      

      seconds Patient's skin is warm and dry. Respiratory: Reports shortness of breath on         

      exertion Airway is patent Respiratory effort is even, unlabored, Respiratory pattern is     

      regular, symmetrical, Breath sounds are clear bilaterally. GI: Abdomen is round             

      non-distended, Bowel sounds present X 4 quads. Abd is soft and non tender X 4 quads.        

      : No deficits noted. No signs and/or symptoms were reported regarding the                 

      genitourinary system. EENT: No deficits noted. No signs and/or symptoms were reported       

      regarding the EENT system. Derm: Skin is intact, is healthy with good turgor, Skin is       

      pink, warm \T\ dry. Musculoskeletal: Circulation, motion, and sensation intact. Capillary   

      refill < 3 seconds, Swelling present in right foot and left foot.                           

18:01 Reassessment: Ultrasound at bedside.                                                    ca1 

18:19 Reassessment: Patient appears in no apparent distress at this time. Patient and/or      ca1 

      family updated on plan of care and expected duration. Pain level reassessed. Patient is     

      alert, oriented x 3, equal unlabored respirations, skin warm/dry/pink. Family at            

      bedside.                                                                                    

19:19 Reassessment: Patient appears in no apparent distress at this time. Patient is alert,   aa1 

      oriented x 3, equal unlabored respirations, skin warm/dry/pink. Discussed d/c \T\ f/u       

      instructions with pt; denies questions or concerns at this time Patient states feeling      

      better.                                                                                     

                                                                                                  

Vital Signs:                                                                                      

16:57  / 83; Pulse 59; Resp 16; Temp 97.7; Pulse Ox 100% on R/A; Weight 113.4 kg;       hb  

      Height 5 ft. 8 in. (172.72 cm); Pain 7/10;                                                  

18:19  / 74; Pulse 52; Resp 16 S; Pulse Ox 99% on R/A;                                  ca1 

19:19  / 73; Pulse 56; Resp 16; Temp 97.9; Pulse Ox 98% on R/A; Pain 4/10;              aa1 

16:57 Body Mass Index 38.01 (113.40 kg, 172.72 cm)                                            hb  

                                                                                                  

ED Course:                                                                                        

16:47 Patient arrived in ED.                                                                  mr  

16:47 None, None is Private Physician.                                                        mr  

16:56 Triage completed.                                                                       hb  

16:57 Arm band placed on.                                                                     hb  

17:01 [facility]                                                        snw 

17:01 Ralph Mckee MD is Attending Physician.                                              snw 

17:10 Patient has correct armband on for positive identification. Placed in gown. Bed in low  ca1 

      position. Call light in reach. Side rails up X 1. Pulse ox on. NIBP on. Warm blanket        

      given.                                                                                      

17:31 Brigida Pascual, RN is Primary Nurse.                                                      ca1 

17:53 Initial lab(s) drawn, by me, sent to lab. EKG done, by ED staff. Inserted saline lock:  lt1 

      20 gauge in right antecubital area, using aseptic technique.                                

17:56 Chest Pa And Lat (2 Views) XRAY In Process Unspecified.                                 EDMS

18:13 PHCP role handed off by Dania Faulkner FNP-C                                         snw 

18:13 Tacos Green PA is PHCP.                                                                snw 

18:20 Ultrasound completed. Patient tolerated well.                                           sg3 

18:20 US Extremity Venous W Compression Michael In Process Unspecified.                           EDMS

19:19 No provider procedures requiring assistance completed. IV discontinued, intact,         aa1 

      bleeding controlled, No redness/swelling at site. Pressure dressing applied.                

                                                                                                  

Administered Medications:                                                                         

18:16 Drug: LaSIX 40 mg Route: PO;                                                            ca1 

                                                                                                  

                                                                                                  

Outcome:                                                                                          

19:16 Discharge ordered by MD.                                                                cp  

19:33 Discharged to home ambulatory, with family.                                             aa1 

19:33 Condition: good                                                                             

19:33 Discharge instructions given to patient, Instructed on discharge instructions, follow       

      up and referral plans. medication usage, Demonstrated understanding of instructions,        

      follow-up care, medications.                                                                

19:33 Prescriptions given X 1.                                                                    

19:34 Patient left the ED.                                                                    aa1 

                                                                                                  

Signatures:                                                                                       

Dispatcher [employer]                           EDMS                                                 

Shannon Rendon RN                  RN   aa1                                                  

Dania Faulkner FNP-C FNP-Dirk                                                  

Nicolle Wagoner                                 mr                                                   

Tacos Green PA PA cp Baxter, Heather, RN RN                                                      

Lindsay Rockwell                               sg3                                                  

Brigida Pascual RN RN   ca1                                                  

Sonia Clifford                                   lt1                                                  

                                                                                                  

**************************************************************************************************

## 2019-08-18 NOTE — EKG
Test Date:    2019-08-17               Test Time:    17:39:58

Technician:   DANAT                                    

                                                     

MEASUREMENT RESULTS:                                       

Intervals:                                           

Rate:         50                                     

CA:           148                                    

QRSD:         112                                    

QT:           424                                    

QTc:          386                                    

Axis:                                                

P:            47                                     

CA:           148                                    

QRS:          -10                                    

T:            21                                     

                                                     

INTERPRETIVE STATEMENTS:                                       

                                                     

Sinus bradycardia

Otherwise normal ECG



Electronically Signed On 08-18-19 16:56:12 CDT by Geo Rodriguez

## 2021-08-10 ENCOUNTER — HOSPITAL ENCOUNTER (EMERGENCY)
Dept: HOSPITAL 97 - ER | Age: 31
Discharge: HOME | End: 2021-08-10
Payer: SELF-PAY

## 2021-08-10 VITALS — SYSTOLIC BLOOD PRESSURE: 118 MMHG | DIASTOLIC BLOOD PRESSURE: 70 MMHG | OXYGEN SATURATION: 94 % | TEMPERATURE: 98.5 F

## 2021-08-10 DIAGNOSIS — J12.82: ICD-10-CM

## 2021-08-10 DIAGNOSIS — Z88.1: ICD-10-CM

## 2021-08-10 DIAGNOSIS — U07.1: Primary | ICD-10-CM

## 2021-08-10 LAB — SARS-COV-2 RNA RESP QL NAA+PROBE: POSITIVE

## 2021-08-10 PROCEDURE — 99283 EMERGENCY DEPT VISIT LOW MDM: CPT

## 2021-08-10 PROCEDURE — 71046 X-RAY EXAM CHEST 2 VIEWS: CPT

## 2021-08-10 PROCEDURE — 0240U: CPT

## 2021-08-10 NOTE — ER
Nurse's Notes                                                                                     

 [facility]                                                                 

Name: Max Westbrook                                                                                   

Age: 31 yrs                                                                                       

Sex: Male                                                                                         

: 1990                                                                                   

MRN: [id number]                                                                                   

Arrival Date: 08/10/2021                                                                          

Time: 15:24                                                                                       

Account#: [id number]                                                                            

Bed Waiting                                                                                       

Private MD:                                                                                       

Diagnosis: Pneumonia due to SARS-associated coronavirus;SARS-associated coronavirus as the cause  

  of diseases classified elsewhere                                                                

                                                                                                  

Presentation:                                                                                     

08/10                                                                                             

15:51 Chief complaint: Patient states: COugh, congestion, chills x 1 wk. Coronavirus screen:  kg  

      Client denies travel out of the [place] in the last 14 days. At this time, unable to           

      obtain information related to travel outside the [place] Client presents with at least one     

      sign or symptom that may indicate coronavirus-19. Standard/surgical mask placed on the      

      client. Provider contacted for isolation considerations. Ebola Screen: Patient negative     

      for fever greater than or equal to 101.5 degrees Fahrenheit, and additional compatible      

      Ebola Virus Disease symptoms Patient denies exposure to infectious person. Patient          

      denies travel to an Ebola-affected area in the 21 days before illness onset. Initial        

      Sepsis Screen: Does the patient meet any 2 criteria? No. Patient's initial sepsis           

      screen is negative. Does the patient have a suspected source of infection? No.              

      Patient's initial sepsis screen is negative. Risk Assessment: Do you want to hurt           

      yourself or someone else? Patient reports no desire to harm self or others. Onset of        

      symptoms was 2021.                                                               

15:51 Method Of Arrival: Ambulatory                                                           kg  

15:51 Acuity: EMMETT 4                                                                           kg  

                                                                                                  

Triage Assessment:                                                                                

15:52 General: Appears in no apparent distress. Behavior is calm, cooperative, appropriate    kg  

      for age, quiet. Pain: Complains of pain in Generalized. Respiratory: Reports shortness      

      of breath at rest cough that is productive, Airway is patent Trachea midline                

      Respiratory effort is even, unlabored, relaxed, Respiratory pattern is regular, Breath      

      sounds are clear bilaterally. GI: Reports lower abdominal pain, upper abdominal pain,       

      diarrhea.                                                                                   

                                                                                                  

Historical:                                                                                       

- Allergies:                                                                                      

15:52 Augmentin;                                                                              kg  

- Home Meds:                                                                                      

15:52 None [Active];                                                                          kg  

- PMHx:                                                                                           

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  

- PSHx:                                                                                           

15:52 None;                                                                                   kg  

                                                                                                  

- Immunization history:: Adult Immunizations not up to date, Client reports having NOT            

  received the Covid vaccine.                                                                     

- Social history:: Smoking status: Reported history of juuling and/or vaping. Patient             

  uses alcohol, weekly.                                                                           

                                                                                                  

                                                                                                  

Screening:                                                                                        

15:55 Abuse screen: Denies threats or abuse. Denies injuries from another. Nutritional        kg  

      screening: No deficits noted. Tuberculosis screening: No symptoms or risk factors           

      identified. Fall Risk None identified.                                                      

                                                                                                  

Vital Signs:                                                                                      

15:51  / 70; Pulse 100; Resp 20; Temp 98.5(O); Pulse Ox 94% on R/A; Weight 117.93 kg    kg  

      (R); Height 5 ft. 9 in. (175.26 cm) (R); Pain 10/10;                                        

15:51 Body Mass Index 38.39 (117.93 kg, 175.26 cm)                                            kg  

                                                                                                  

ED Course:                                                                                        

15:24 Patient arrived in ED.                                                                  mr  

15:49 Avery Knight PA is PHCP.                                                               jr8 

15:50 Dewey Cárdenas MD is Attending Physician.                                                jr8 

15:52 Triage completed.                                                                       kg  

15:52 Arm band placed on right wrist.                                                         kg  

15:55 Patient has correct armband on for positive identification.                             kg  

15:55 No provider procedures requiring assistance completed.                                  kg  

16:20 XRAY Chest Pa And Lat (2 Views) In Process Unspecified.                                 EDMS

20:34 Patient did not have IV access during this emergency room visit.                        kg  

                                                                                                  

Administered Medications:                                                                         

No medications were administered                                                                  

                                                                                                  

                                                                                                  

Outcome:                                                                                          

18:48 Discharge ordered by MD.                                                                [id number] 

20:34 Discharged to home ambulatory.                                                          kg  

20:34 Condition: good                                                                             

20:34 Discharge instructions given to patient, Instructed on discharge instructions, follow       

      up and referral plans. Demonstrated understanding of instructions, follow-up care,          

      medications, Prescriptions given X 4.                                                       

20:34 Patient left the ED.                                                                    kg  

                                                                                                  

Signatures:                                                                                       

Dispatcher [employer]                           EDMS                                                 

Nicolle Wagoner                                 mr                                                   

Avery Knight PA                        PA   jr8                                                  

Eli Mars, RN                     RN   kg                                                   

                                                                                                  

**************************************************************************************************

## 2021-08-10 NOTE — RAD REPORT
EXAM DESCRIPTION:  RAD - Chest Pa And Lat (2 Views) - 8/10/2021 4:20 pm

 

CLINICAL HISTORY:  Cough;Dyspnea

Chest pain.

 

COMPARISON:  Chest Pa And Lat (2 Views) dated 8/17/2019

 

FINDINGS:  Mild to moderate bilateral pulmonary opacities are present, greater on the left, likely re
presenting Covid-19 infection. The heart is normal in size. No displaced fractures.

## 2021-08-10 NOTE — EDPHYS
Physician Documentation                                                                           

 [facility]                                                                 

Name: Max Westbrook                                                                                   

Age: 31 yrs                                                                                       

Sex: Male                                                                                         

: 1990                                                                                   

MRN: [id number]                                                                                   

Arrival Date: 08/10/2021                                                                          

Time: 15:24                                                                                       

Account#: [id number]                                                                            

Bed Waiting                                                                                       

Private MD:                                                                                       

ED Physician Dewey Cárdenas                                                                         

HPI:                                                                                              

08/10                                                                                             

16:10 This 31 yrs old  Male presents to ER via Ambulatory with complaints of Cough,   jr8 

      Congestion, Shortness Of Breath.                                                            

16:10 The patient or guardian reports cough, that is intermittent, described as mild, with    jr8 

      productive sputum, Clear, difficulty breathing. Onset: The symptoms/episode                 

      began/occurred gradually, 1 week(s) ago. Severity of symptoms: At their worst the           

      symptoms were moderate, in the emergency department the symptoms are unchanged.             

      Modifying factors: The symptoms are alleviated by nothing, the symptoms are aggravated      

      by nothing. Associated signs and symptoms: Pertinent positives: Body aches, chills,         

      diarrhea. The patient has not experienced similar symptoms in the past. The patient has     

      not recently seen a physician.                                                              

                                                                                                  

Historical:                                                                                       

- Allergies:                                                                                      

15:52 Augmentin;                                                                              kg  

- Home Meds:                                                                                      

15:52 None [Active];                                                                          kg  

- PMHx:                                                                                           

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  

- PSHx:                                                                                           

15:52 None;                                                                                   kg  

                                                                                                  

- Immunization history:: Adult Immunizations not up to date, Client reports having NOT            

  received the Covid vaccine.                                                                     

- Social history:: Smoking status: Reported history of juuling and/or vaping. Patient             

  uses alcohol, weekly.                                                                           

                                                                                                  

                                                                                                  

ROS:                                                                                              

16:10 Eyes: Negative for injury, pain, redness, and discharge, ENT: Negative for injury,      jr8 

      pain, and discharge, Neck: Negative for injury, pain, and swelling, Cardiovascular:         

      Negative for chest pain, palpitations, and edema, Back: Negative for injury and pain,       

      MS/Extremity: Negative for injury and deformity, Skin: Negative for injury, rash, and       

      discoloration, Neuro: Negative for headache, weakness, numbness, tingling, and seizure.     

16:10 Constitutional: Positive for body aches, chills.                                            

16:10 Respiratory: Positive for cough, shortness of breath.                                       

16:10 Abdomen/GI: Positive for diarrhea, Negative for abdominal pain, nausea and vomiting.        

                                                                                                  

Exam:                                                                                             

16:10 Eyes:  Pupils equal round and reactive to light, extra-ocular motions intact.  Lids and jr8 

      lashes normal.  Conjunctiva and sclera are non-icteric and not injected.  Cornea within     

      normal limits.  Periorbital areas with no swelling, redness, or edema. ENT:  Nares          

      patent. No nasal discharge, no septal abnormalities noted.  Tympanic membranes are          

      normal and external auditory canals are clear.  Oropharynx with no redness, swelling,       

      or masses, exudates, or evidence of obstruction, uvula midline.  Mucous membranes           

      moist. Neck:  Trachea midline, no thyromegaly or masses palpated, and no cervical           

      lymphadenopathy.  Supple, full range of motion without nuchal rigidity, or vertebral        

      point tenderness.  No Meningismus. Cardiovascular:  Regular rate and rhythm with a          

      normal S1 and S2.  No gallops, murmurs, or rubs.  Normal PMI, no JVD.  No pulse             

      deficits. Respiratory:  Lungs have equal breath sounds bilaterally, clear to                

      auscultation and percussion.  No rales, rhonchi or wheezes noted.  No increased work of     

      breathing, no retractions or nasal flaring. Abdomen/GI:  Soft, non-tender, with normal      

      bowel sounds.  No distension or tympany.  No guarding or rebound.  No evidence of           

      tenderness throughout. Back:  No spinal tenderness.  No costovertebral tenderness.          

      Full range of motion. MS/ Extremity:  Pulses equal, no cyanosis.  Neurovascular intact.     

       Full, normal range of motion. Neuro:  Awake and alert, GCS 15, oriented to person,         

      place, time, and situation.  Cranial nerves II-XII grossly intact.  Motor strength 5/5      

      in all extremities.  Sensory grossly intact.                                                

16:10 Skin: Appearance: Color: normal in color, Temperature: cool, Moisture: damp.                

                                                                                                  

Vital Signs:                                                                                      

15:51  / 70; Pulse 100; Resp 20; Temp 98.5(O); Pulse Ox 94% on R/A; Weight 117.93 kg    kg  

      (R); Height 5 ft. 9 in. (175.26 cm) (R); Pain 10/10;                                        

15:51 Body Mass Index 38.39 (117.93 kg, 175.26 cm)                                            kg  

                                                                                                  

MDM:                                                                                              

15:58 Patient medically screened.                                                             jr8 

18:47 Data reviewed: vital signs, nurses notes, lab test result(s), radiologic studies, plain jr8 

      films. Data interpreted: Pulse oximetry: on room air is 94 %. Interpretation: normal.       

      Counseling: I had a detailed discussion with the patient and/or guardian regarding: the     

      historical points, exam findings, and any diagnostic results supporting the                 

      discharge/admit diagnosis, lab results, radiology results, the need for outpatient          

      follow up, a family practitioner, to return to the emergency department if symptoms         

      worsen or persist or if there are any questions or concerns that arise at home. ED          

      course: Discussed with patient that he does have Covid with pulmonary manifestations.       

      Vital signs have remained stable. Oxygen saturation 94% with ambulation and at rest.        

      Except for will go home at this time. Will start patient on steroids, ivermectin,           

      vitamin regimen, nausea medicine. Close return precautions given to patient as well.        

      Patient comfortable with this plan at this time and knows to come back..                    

                                                                                                  

08/10                                                                                             

18:34 Order name: COVID-19/FLU A+B; Complete Time: 18:51                                      EDMS

08/10                                                                                             

15:48 Order name: XRAY Chest Pa And Lat (2 Views); Complete Time: 16:34                       kg  

                                                                                                  

Administered Medications:                                                                         

No medications were administered                                                                  

                                                                                                  

                                                                                                  

Disposition:                                                                                      

                                                                                             

19:51 Co-signature as Attending Physician, Dewey Cárdenas MD I agree with the assessment and     rn  

      plan of care. Attestation: The patient's history, exam findings, diagnostics, and a         

      summary of any interventions or procedures was reviewed in detail with A[employer] ROOT.      

                                                                                                  

Disposition Summary:                                                                              

08/10/21 18:48                                                                                    

Discharge Ordered                                                                                 

      Location: Home                                                                          [facility] 

      Problem: new                                                                            jr8 

      Symptoms: have improved                                                                 jr8 

      Condition: Stable                                                                       jr8 

      Diagnosis                                                                                   

        - Pneumonia due to SARS-associated coronavirus                                        jr8 

        - SARS-associated coronavirus as the cause of diseases classified elsewhere           jr8 

      Followup:                                                                               jr8 

        - With: Private Physician                                                                  

        - When: 1 week                                                                             

        - Reason: Recheck today's complaints, Continuance of care, Re-evaluation by your           

      physician                                                                                   

      Discharge Instructions:                                                                     

        - Discharge Summary Sheet                                                             jr8 

        - COVID-19                                                                            jr8 

        - Things to Know about the COVID-19 Pandemic - [facility]                                    jr8 

      Forms:                                                                                      

        - Medication Reconciliation Form                                                      jr8 

        - Thank You Letter                                                                    jr8 

        - Antibiotic Education                                                                jr8 

        - Prescription Opioid Use                                                             jr8 

        - Work release form                                                                   jr8 

      Prescriptions:                                                                              

        - ivermectin 3 mg Oral tablet                                                              

            - take 6 tablet by ORAL route once daily for 3 days; 18 tablet; Refills: 0,       jr8 

      Product Selection Permitted                                                                 

        - promethazine-DM 6.25-15 mg/5 mL Oral syrup                                               

            - take 5 milliliter by ORAL route every 4-6 hours As needed as needed, not to     jr8 

      exceed 30 mL in 24 hours; 100 milliliter; Refills: 0, Product Selection Permitted           

        - Prednisone 20 mg Oral Tablet                                                             

            - take 1 tablet by ORAL route once daily for 7 days; 7 tablet; Refills: 0,        jr8 

      Product Selection Permitted                                                                 

        - Zofran 4 mg Oral Tablet                                                                  

            - take 1 tablet by ORAL route every 12 hours As needed; 20 tablet; Refills: 0,    jr8 

      Product Selection Permitted                                                                 

Signatures:                                                                                       

Dispatcher [employer]                           ERUMMS                                                 

Dewey Cárdenas MD MD rn Roszak, Josh, PA                        PA   jr8                                                  

Eli Mars RN                     RN   kg                                                   

                                                                                                  

Corrections: (The following items were deleted from the chart)                                    

08/10                                                                                             

16:56 15:48 CORONAVIRUS+MR.LAB.BRZ ordered. [facility]

                                                                                                  

**************************************************************************************************

## 2021-08-25 ENCOUNTER — HOSPITAL ENCOUNTER (EMERGENCY)
Dept: HOSPITAL 97 - ER | Age: 31
Discharge: HOME | End: 2021-08-25
Payer: SELF-PAY

## 2021-08-25 DIAGNOSIS — Z20.822: Primary | ICD-10-CM

## 2021-08-25 PROCEDURE — 99281 EMR DPT VST MAYX REQ PHY/QHP: CPT

## 2021-08-25 NOTE — EDPHYS
Physician Documentation                                                                           

 [facility]                                                                 

Name: Max Westbrook                                                                                   

Age: 31 yrs                                                                                       

Sex: Male                                                                                         

: 1990                                                                                   

MRN: [id number]                                                                                   

Arrival Date: 2021                                                                          

Time: 09:47                                                                                       

Account#: [id number]                                                                            

Bed Waiting                                                                                       

Private MD:                                                                                       

ED Physician Tacos Kelley                                                                      

HPI:                                                                                              

                                                                                             

10:28 This 31 yrs old  Male presents to ER via [facility] with complaints of covid    kb  

      retest.                                                                                     

10:28 Pt reports he tested positive for covid on 8/10/21. States he still has a cough, but    kb  

      other symptoms have resolved. O2 sat 100%. Pt came today to have a test done again so       

      he can return to work. Severity of symptoms: At their worst the symptoms were mild in       

      the emergency department the symptoms have improved. The patient has not experienced        

      similar symptoms in the past. The patient has been recently seen by a physician:.           

                                                                                                  

Historical:                                                                                       

- Allergies:                                                                                      

10:10 Augmentin;                                                                              aa5 

- PMHx:                                                                                           

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 

                                                                                                  

                                                                                                  

                                                                                                  

ROS:                                                                                              

10:27 Constitutional: Negative for fever, chills, and weight loss.                            kb  

10:27 Respiratory: Positive for cough, Negative for dyspnea on exertion, hemoptysis,              

      orthopnea, pleurisy, shortness of breath, sputum production, wheezing.                      

10:27 All other systems are negative.                                                             

                                                                                                  

Exam:                                                                                             

10:27 Constitutional:  This is a well developed, well nourished patient who is awake, alert,  kb  

      and in no acute distress. Head/Face:  Normocephalic, atraumatic. ENT:  Moist Mucous         

      membranes Respiratory:  Respirations even and unlabored. No increased work of               

      breathing, no retractions or nasal flaring. Skin:  Warm, dry with normal turgor.            

      Normal color. MS/ Extremity:  Pulses equal, no cyanosis.  Neurovascular intact.  Full,      

      normal range of motion. Neuro:  Awake and alert, GCS 15, oriented to person, place,         

      time, and situation. Moves all extremities. Normal gait. Psych:  Awake, alert, with         

      orientation to person, place and time.  Behavior, mood, and affect are within normal        

      limits.                                                                                     

                                                                                                  

Vital Signs:                                                                                      

10:10  / 104; Pulse 90; Resp 20 S; Temp 98.3(TE); Pulse Ox 100% on R/A; Weight 113.4 kg aa5 

      (R); Height 5 ft. 9 in. (175.26 cm) (R);                                                    

10:10 Body Mass Index 36.92 (113.40 kg, 175.26 cm)                                            aa5 

                                                                                                  

MDM:                                                                                              

10:14 Data reviewed: vital signs, nurses notes. Data interpreted: Pulse oximetry: on room air kb  

      is 100 %. Interpretation: normal. Counseling: I had a detailed discussion with the          

      patient and/or guardian regarding: the historical points, exam findings, and any            

      diagnostic results supporting the discharge/admit diagnosis, the need for outpatient        

      follow up, a family practitioner, to return to the emergency department if symptoms         

      worsen or persist or if there are any questions or concerns that arise at home.             

10:14 Patient medically screened.                                                             kb  

10:29 ED course: Pt asymptomatic of BP. States it is always high.                             kb  

                                                                                                  

Administered Medications:                                                                         

No medications were administered                                                                  

                                                                                                  

                                                                                                  

Disposition:                                                                                      

10:14 Encounter for repeat COVID test.                                                        kb  

                                                                                             

07:49 Co-signature as Attending Physician, Tacos Kelley MD I agree with the assessment and  ivelisse 

      plan of care.                                                                               

                                                                                                  

Disposition Summary:                                                                              

21 10:14                                                                                    

Discharge Ordered                                                                                 

      Location: Home                                                                          kb  

      Condition: Stable                                                                       kb  

      Diagnosis                                                                                   

        - Encounter for screening, unspecified                                                kb  

      Followup:                                                                               kb  

        - With: Private Physician                                                                  

        - When: 2 - 3 days                                                                         

        - Reason: Recheck today's complaints, Continuance of care, Re-evaluation by your           

      physician                                                                                   

      Followup:                                                                               kb  

        - With: Emergency Department                                                               

        - When: As needed                                                                          

        - Reason: Worsening of condition                                                           

      Forms:                                                                                      

        - Medication Reconciliation Form                                                      kb  

        - Thank You Letter                                                                    kb  

        - Antibiotic Education                                                                kb  

        - Prescription Opioid Use                                                             kb  

Signatures:                                                                                       

Audra Galvez, MARKIE-C                 WALIP-Tacos Abbott MD MD cha Calderon, Audri, RN                     RN   aa5                                                  

                                                                                                  

**************************************************************************************************

## 2021-08-25 NOTE — ER
Nurse's Notes                                                                                     

 [facility]                                                                 

Name: Max Westbrook                                                                                   

Age: 31 yrs                                                                                       

Sex: Male                                                                                         

: 1990                                                                                   

MRN: [id number]                                                                                   

Arrival Date: 2021                                                                          

Time: 09:47                                                                                       

Account#: [id number]                                                                            

Bed Waiting                                                                                       

Private MD:                                                                                       

Diagnosis: Encounter for screening, unspecified                                                   

                                                                                                  

Presentation:                                                                                     

                                                                                             

10:10 Chief complaint: Patient states: tested positive for covid-19 approximately 15 days ago aa5 

      and needs to be retested to be released to go back to work.                                 

10:10 Coronavirus screen: Client reports previous positive COVID test result. Ebola Screen:   aa5 

      Patient negative for fever greater than or equal to 101.5 degrees Fahrenheit, and           

      additional compatible Ebola Virus Disease symptoms. Initial Sepsis Screen: Does the         

      patient meet any 2 criteria? No. Patient's initial sepsis screen is negative. Does the      

      patient have a suspected source of infection? No. Patient's initial sepsis screen is        

      negative. Risk Assessment: Do you want to hurt yourself or someone else? Patient            

      reports no desire to harm self or others. Onset of symptoms was 2021.            

10:10 Method Of Arrival: Ambulatory                                                           aa5 

10:10 Acuity: EMMETT 5                                                                           aa5 

                                                                                                  

Historical:                                                                                       

- Allergies:                                                                                      

10:10 Augmentin;                                                                              aa5 

- PMHx:                                                                                           

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 

                                                                                                  

                                                                                                  

                                                                                                  

Vital Signs:                                                                                      

10:10  / 104; Pulse 90; Resp 20 S; Temp 98.3(TE); Pulse Ox 100% on R/A; Weight 113.4 kg aa5 

      (R); Height 5 ft. 9 in. (175.26 cm) (R);                                                    

10:10 Body Mass Index 36.92 (113.40 kg, 175.26 cm)                                            aa5 

                                                                                                  

ED Course:                                                                                        

09:47 Patient arrived in ED.                                                                  as  

09:52 [facility]                                                        kb  

09:52 Tacos Kelley MD is Attending Physician.                                             kb  

10:10 Arm band placed on.                                                                     aa5 

10:53 Triage completed.                                                                       aa5 

                                                                                                  

Administered Medications:                                                                         

No medications were administered                                                                  

                                                                                                  

                                                                                                  

Outcome:                                                                                          

10:14 Discharge ordered by MD.                                                                kb  

10:18 Medical screen evaluation completed per provider.                                       aa5 

10:19 Patient left the ED.                                                                    iw  

                                                                                                  

Signatures:                                                                                       

Audra Galvez FNP-C FNP-Chrystal Hurtado                             as                                                   

Pete, Jessy, RN                     RN   iw                                                   

Sofia Latham, RN                     RN   aa5                                                  

                                                                                                  

**************************************************************************************************

## 2023-03-09 ENCOUNTER — HOSPITAL ENCOUNTER (EMERGENCY)
Dept: HOSPITAL 97 - ER | Age: 33
LOS: 1 days | Discharge: HOME | End: 2023-03-10
Payer: COMMERCIAL

## 2023-03-09 DIAGNOSIS — Z88.1: ICD-10-CM

## 2023-03-09 DIAGNOSIS — R30.0: ICD-10-CM

## 2023-03-09 DIAGNOSIS — R31.9: Primary | ICD-10-CM

## 2023-03-09 LAB
ALBUMIN SERPL BCP-MCNC: 3.7 G/DL (ref 3.4–5)
ALP SERPL-CCNC: 100 U/L (ref 45–117)
ALT SERPL W P-5'-P-CCNC: 34 U/L (ref 16–61)
AST SERPL W P-5'-P-CCNC: 23 U/L (ref 15–37)
BUN BLD-MCNC: 22 MG/DL (ref 7–18)
GLUCOSE SERPLBLD-MCNC: 100 MG/DL (ref 74–106)
HCT VFR BLD CALC: 46.6 % (ref 39.6–49)
LIPASE SERPL-CCNC: 41 U/L (ref 13–75)
LYMPHOCYTES # SPEC AUTO: 2 K/UL (ref 0.7–4.9)
MCV RBC: 80.3 FL (ref 80–100)
PMV BLD: 10 FL (ref 7.6–11.3)
POTASSIUM SERPL-SCNC: 3.5 MMOL/L (ref 3.5–5.1)
RBC # BLD: 5.81 M/UL (ref 4.33–5.43)

## 2023-03-09 PROCEDURE — 99284 EMERGENCY DEPT VISIT MOD MDM: CPT

## 2023-03-09 PROCEDURE — 96361 HYDRATE IV INFUSION ADD-ON: CPT

## 2023-03-09 PROCEDURE — 81003 URINALYSIS AUTO W/O SCOPE: CPT

## 2023-03-09 PROCEDURE — 83690 ASSAY OF LIPASE: CPT

## 2023-03-09 PROCEDURE — 96365 THER/PROPH/DIAG IV INF INIT: CPT

## 2023-03-09 PROCEDURE — 74177 CT ABD & PELVIS W/CONTRAST: CPT

## 2023-03-09 PROCEDURE — 87490 CHLMYD TRACH DNA DIR PROBE: CPT

## 2023-03-09 PROCEDURE — 36415 COLL VENOUS BLD VENIPUNCTURE: CPT

## 2023-03-09 PROCEDURE — 85025 COMPLETE CBC W/AUTO DIFF WBC: CPT

## 2023-03-09 PROCEDURE — 80053 COMPREHEN METABOLIC PANEL: CPT

## 2023-03-09 PROCEDURE — 81015 MICROSCOPIC EXAM OF URINE: CPT

## 2023-03-09 PROCEDURE — 87590 N.GONORRHOEAE DNA DIR PROB: CPT

## 2023-03-09 NOTE — RAD REPORT
EXAM DESCRIPTION:  CTAbdomen   Pelvis W Contrast - 3/9/2023 9:13 pm

 

CLINICAL HISTORY:  Abdominal pain.

HEMATURIA

 

COMPARISON:  No comparisons

 

TECHNIQUE:  Biphasic CT imaging of the abdomen and pelvis was performed with 100 ml non-ionic IV cont
rast.

 

All CT scans are performed using dose optimization technique as appropriate and may include automated
 exposure control or mA/KV adjustment according to patient size.

 

FINDINGS:  The lung bases are clear.

 

The liver, spleen, pancreas, adrenal glands and kidneys are within normal limits.

 

No bowel obstruction, free air, free fluid or abscess.  The appendix is normal.  No evidence of signi
ficant lymphadenopathy.

 

No suspicious bony findings.

 

IMPRESSION:  No acute intra-abdominal or pelvic finding.

## 2023-03-10 VITALS — DIASTOLIC BLOOD PRESSURE: 92 MMHG | OXYGEN SATURATION: 97 % | SYSTOLIC BLOOD PRESSURE: 154 MMHG

## 2023-03-10 VITALS — TEMPERATURE: 97.4 F

## 2023-03-24 NOTE — ER
Nurse's Notes                                                                                     

 [facility]                                                                 

Name: Max Westbrook Sr                                                                                

Age: 33 yrs                                                                                       

Sex: Male                                                                                         

: 1990                                                                                   

MRN: [id number]                                                                                   

Arrival Date: 2023                                                                          

Time: 17:14                                                                                       

Account#: [id number]                                                                            

Bed 9                                                                                             

Private MD:                                                                                       

Diagnosis: Hematuria, unspecified;Dysuria                                                         

                                                                                                  

Presentation:                                                                                     

                                                                                             

17:17 Chief complaint: Blood in urine since this morning. Denies pain. Coronavirus screen: At   

      this time, the client does not indicate any symptoms associated with coronavirus-19.        

      Ebola Screen: No symptoms or risks identified at this time. Initial Sepsis Screen: Does     

      the patient meet any 2 criteria? No. Patient's initial sepsis screen is negative. Does      

      the patient have a suspected source of infection? No. Patient's initial sepsis screen       

      is negative. Risk Assessment: Do you want to hurt yourself or someone else? Patient         

      reports no desire to harm self or others. Onset of symptoms was 2023.             

17:17 Method Of Arrival: Ambulatory                                                           hb  

17:17 Acuity: EMMETT 3                                                                           hb  

                                                                                                  

Historical:                                                                                       

- Allergies:                                                                                      

17:18 Augmentin;                                                                              hb  

- PMHx:                                                                                           

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  

                                                                                                  

- Immunization history:: Adult Immunizations up to date.                                          

- Social history:: Smoking status: unknown.                                                       

                                                                                                  

                                                                                                  

Screenin:20 [facility] ED Fall Risk Assessment (Adult) Score/Fall Risk Level 0 - 2 = Low Risk. Abuse  eh3 

      screen: Denies threats or abuse. Denies injuries from another. Nutritional screening:       

      No deficits noted. Tuberculosis screening: No symptoms or risk factors identified.          

                                                                                                  

Assessment:                                                                                       

17:20 General: Appears in no apparent distress. comfortable, Behavior is calm, cooperative,   eh3 

      appropriate for age. Pain: Denies pain. Neuro: Level of Consciousness is awake, alert,      

      obeys commands, Oriented to person, place, time, situation. Cardiovascular: Capillary       

      refill < 3 seconds Patient's skin is warm and dry. Respiratory: Airway is patent            

      Respiratory effort is even, unlabored, Respiratory pattern is regular, symmetrical. GI:     

      No signs and/or symptoms were reported involving the gastrointestinal system. Abdomen       

      is round non-distended. : Reports blood in urine. EENT: No signs and/or symptoms were     

      reported regarding the EENT system. Derm: No signs and/or symptoms reported regarding       

      the dermatologic system. Skin is pink, warm \T\ dry. Musculoskeletal: No signs and/or       

      symptoms reported regarding the musculoskeletal system. Circulation, motion, and            

      sensation intact. Range of motion: intact in all extremities.                               

18:00 Reassessment: Patient appears in no apparent distress at this time. Patient and/or      eh3 

      family updated on plan of care and expected duration. Pain level reassessed. Patient is     

      alert, oriented x 3, equal unlabored respirations, skin warm/dry/pink.                      

19:00 Reassessment: Patient appears in no apparent distress at this time. Patient and/or      eh3 

      family updated on plan of care and expected duration. Pain level reassessed. Patient is     

      alert, oriented x 3, equal unlabored respirations, skin warm/dry/pink.                      

20:00 Reassessment: Patient appears in no apparent distress at this time. Patient and/or      eh3 

      family updated on plan of care and expected duration. Pain level reassessed. Patient is     

      alert, oriented x 3, equal unlabored respirations, skin warm/dry/pink.                      

21:00 Reassessment: Patient appears in no apparent distress at this time. Patient and/or      eh3 

      family updated on plan of care and expected duration. Pain level reassessed. Patient is     

      alert, oriented x 3, equal unlabored respirations, skin warm/dry/pink.                      

22:00 Reassessment: Patient appears in no apparent distress at this time. Patient and/or      eh3 

      family updated on plan of care and expected duration. Pain level reassessed. Patient is     

      alert, oriented x 3, equal unlabored respirations, skin warm/dry/pink.                      

23:00 Reassessment: Patient appears in no apparent distress at this time. Patient and/or      eh3 

      family updated on plan of care and expected duration. Pain level reassessed. Patient is     

      alert, oriented x 3, equal unlabored respirations, skin warm/dry/pink.                      

                                                                                                  

Vital Signs:                                                                                      

17:17  / 103; Pulse 87; Resp 20; Temp 97.4; Pulse Ox 100% on R/A; Weight 122.47 kg;     hb  

      Height 5 ft. 9 in. ; Pain 0/10;                                                             

19:00  / 93; Pulse 73; Resp 16; Pulse Ox 96% on R/A;                                    eh3 

21:00  / 98; Pulse 77; Resp 16; Pulse Ox 95% on R/A;                                    eh3 

23:00  / 92; Pulse 75; Resp 16; Pulse Ox 97% on R/A;                                    eh3 

17:17 Body Mass Index 39.87 (122.47 kg, 175.26 cm)                                            hb  

17:17 Pain Scale: Adult                                                                       hb  

                                                                                                  

ED Course:                                                                                        

17:14 Patient arrived in ED.                                                                  am2 

17:18 Triage completed.                                                                       hb  

17:19 Alfonso Baldwin NP is PHCP.                                                           pm1 

17:19 Dewey Cárdenas MD is Attending Physician.                                                pm1 

17:19 Arm band placed on.                                                                     hb  

17:20 Venice Becerril RN is Primary Nurse.                                                        eh3 

17:20 Patient has correct armband on for positive identification. Bed in low position. Call   eh3 

      light in reach. Side rails up X2. Pulse ox on. NIBP on. Door closed. Noise minimized.       

18:45 Missed attempt(s): 20 gauge in left wrist. Bleeding controlled, band aid applied,       eh3 

      catheter tip intact.                                                                        

19:51 PHCP role handed off by Alfonso Baldwin NP                                            cp  

19:51 Tacos Green PA is PHCP.                                                                cp  

20:00 Inserted saline lock: 22 gauge in left antecubital area, using aseptic technique.       eh3 

21:15 CT Abd/Pelvis - IV Contrast Only In Process Unspecified.                                EDMS

23:12 Jef Serna MD is Referral Physician.                                              cp  

23:33 No provider procedures requiring assistance completed. IV discontinued, intact,         eh3 

      bleeding controlled, No redness/swelling at site. Pressure dressing applied.                

                                                                                                  

Administered Medications:                                                                         

20:00 Drug: NS 0.9% IV 1000 ml Route: IV; Rate: 1 bolus; Site: left antecubital;              eh3 

21:00 Follow up: IV Status: Completed infusion; IV Intake: 1000ml                             eh3 

23:00 Drug: AZITHromycin PO 1 grams Route: PO;                                                eh3 

23:31 Follow up: Response: No adverse reaction                                                eh3 

23:00 Drug: Rocephin IV 1 grams Route: IV; Rate: calculated rate; Site: left antecubital;     eh3 

23:31 Follow up: Response: No adverse reaction; IV Status: Completed infusion; IV Intake: 44hvaf2 

                                                                                                  

                                                                                                  

Medication:                                                                                       

23:33 VIS not applicable for this client.                                                     eh3 

                                                                                                  

Intake:                                                                                           

21:00 IV: 1000ml; Total: 1000ml.                                                              eh3 

23:31 IV: 50ml; Total: 1050ml.                                                                eh3 

                                                                                                  

Outcome:                                                                                          

23:12 Discharge ordered by MD.                                                                cp  

23:33 Discharged to home ambulatory.                                                          eh3 

23:33 Condition: stable                                                                           

23:33 Discharge instructions given to patient, Instructed on discharge instructions, follow       

      up and referral plans. medication usage, Demonstrated understanding of instructions,        

      follow-up care, medications, Prescriptions given X 1.                                       

03/10                                                                                             

00:03 Patient left the ED.                                                                    eh3 

                                                                                                  

Signatures:                                                                                       

Dispatcher [employer]                           EDMS                                                 

Tacos Green PA PA   cp                                                   

Alfonso Baldwin, NP                    NP   pm1                                                  

Marisol Gallegos RN                     RN                                                      

Cinthia Mercer                               2                                                  

Venice Becerril, RIGOBERTO                          RN   eh3                                                  

                                                                                                  

Corrections: (The following items were deleted from the chart)                                    

                                                                                             

18:16 17:30 Venice Becerril, RIGOBERTO is Primary Nurse. eh3                                              eh3 

                                                                                                  

**************************************************************************************************

## 2023-03-24 NOTE — EDPHYS
Physician Documentation                                                                           

 [facility]                                                                 

Name: Max Westbrook Sr                                                                                

Age: 33 yrs                                                                                       

Sex: Male                                                                                         

: 1990                                                                                   

MRN: [id number]                                                                                   

Arrival Date: 2023                                                                          

Time: 17:14                                                                                       

Account#: [id number]                                                                            

Bed 9                                                                                             

Private MD:                                                                                       

ED Physician Dewey Cárdenas                                                                         

HPI:                                                                                              

                                                                                             

17:31 This 33 yrs old  Male presents to ER via Ambulatory with complaints of Urinary  pm1 

      Problem - blood.                                                                            

17:31 The patient presents with urinary symptoms, hematuria. Onset: The symptoms/episode      pm1 

      began/occurred today. Modifying factors: The symptoms are alleviated by nothing, the        

      symptoms are aggravated by nothing. Associated signs and symptoms: Pertinent negatives:     

      abdominal pain, dysuria, fever. Severity of symptoms: in the emergency department the       

      symptoms have improved. The patient has not experienced similar symptoms in the past.       

      The patient has not recently seen a physician. 33-year-old male presents ago with           

      complaints of blood in urine. Patient reports a few drops of bright red blood in the        

      front of the toilet when he was having a bowel movement. Negative for any blood from        

      rectum or stool. Patient noticed some blood in his underwear afterwards and also had        

      some dried blood on the tip of his meatus. Patient reports last sexual activity was         

      masturbation about 3 days ago but reports engaging in intercourse about 1 month ago.        

      Negative for penile discharge, genital pain, abdominal pain, flank pain. Patient            

      reports history of kidney stones about 1 year ago.                                          

                                                                                                  

Historical:                                                                                       

- Allergies:                                                                                      

17:18 Augmentin;                                                                              hb  

- PMHx:                                                                                           

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  

                                                                                                  

- Immunization history:: Adult Immunizations up to date.                                          

- Social history:: Smoking status: unknown.                                                       

                                                                                                  

                                                                                                  

ROS:                                                                                              

17:31 Constitutional: Negative for fever, chills, and weight loss, Cardiovascular: Negative   pm1 

      for chest pain, palpitations, and edema, Respiratory: Negative for shortness of breath,     

      cough, wheezing, and pleuritic chest pain, Abdomen/GI: Negative for abdominal pain,         

      nausea, vomiting, diarrhea, and constipation, Back: Negative for injury and pain.           

17:31 MS/Extremity: Negative for injury and deformity, Skin: Negative for injury, rash, and       

      discoloration, Neuro: Negative for headache, weakness, numbness, tingling, and seizure.     

17:31 : Positive for hematuria, Negative for pelvic pain, flank pain, burning with              

      urination, penile discharge, penile pain, testicular pain                                   

17:31 All other systems are negative.                                                             

                                                                                                  

Exam:                                                                                             

17:31 Constitutional:  This is a well developed, well nourished patient who is awake, alert,  pm1 

      and in no acute distress. Head/Face:  Normocephalic, atraumatic.                            

17:31 Back:  No spinal tenderness.  No costovertebral tenderness.  Full range of motion.          

      Skin:  Warm, dry with normal turgor.  Normal color with no rashes, no lesions, and no       

      evidence of cellulitis. MS/ Extremity:  Pulses equal, no cyanosis.  Neurovascular           

      intact.  Full, normal range of motion.                                                      

17:31 Cardiovascular: Exam negative for  acute changes.                                           

17:31 Respiratory: Exam negative for  acute changes, respiratory distress, shortness of           

      breath.                                                                                     

17:31 Abdomen/GI: Exam negative for acute changes, Inspection: obese Palpation: abdomen is        

      soft and non-tender, in all quadrants.                                                      

17:31 Neuro: Exam negative for acute changes, Orientation: is normal, Mentation: is normal,       

      Motor: is normal, moves all fours.                                                          

                                                                                                  

Vital Signs:                                                                                      

17:17  / 103; Pulse 87; Resp 20; Temp 97.4; Pulse Ox 100% on R/A; Weight 122.47 kg;     hb  

      Height 5 ft. 9 in. ; Pain 0/10;                                                             

19:00  / 93; Pulse 73; Resp 16; Pulse Ox 96% on R/A;                                    eh3 

21:00  / 98; Pulse 77; Resp 16; Pulse Ox 95% on R/A;                                    eh3 

23:00  / 92; Pulse 75; Resp 16; Pulse Ox 97% on R/A;                                    eh3 

17:17 Body Mass Index 39.87 (122.47 kg, 175.26 cm)                                            hb  

17:17 Pain Scale: Adult                                                                       hb  

                                                                                                  

MDM:                                                                                              

17:20 Patient medically screened.                                                             pm1 

17:40 Data reviewed: vital signs.                                                             pm1 

17:40 Refusal of service: The patient/guardian displays adequate decision making capability   pm1 

      and despite a detailed discussion of alternatives, benefits, risks, and consequences        

      refuses: Patient refused physical examination of genitalia.                                 

18:51 ED course: Patient decided to get the blood work and CT scan. He initially wanted to    pm1 

      know if he could get a diagnosis from the urine alone.                                      

                                                                                                  

                                                                                             

17:31 Order name: CBC with Diff; Complete Time: 19:49                                         pm1 

                                                                                             

22:19 Interpretation: Normal except: RBC 5.81; MCH 26.3; ; RDW 15.3.                   cp  

                                                                                             

17:31 Order name: CMP; Complete Time: 22:18                                                   pm1 

                                                                                             

22:18 Interpretation: Normal except: BUN 22; GLOB 4.2; A/G 0.9.                               cp  

                                                                                             

17:31 Order name: Lipase; Complete Time: 22:18                                                pm1 

                                                                                             

18:05 Order name: Urine Microscopic Only; Complete Time: 18:49                                pm1 

                                                                                             

18:27 Order name: Urine Dipstick-Ancillary; Complete Time: 18:31                              EDMS

                                                                                             

22:45 Order name: Gc Culture                                                                  cp  

                                                                                             

17:31 Order name: CT Abd/Pelvis - IV Contrast Only; Complete Time: 22:18                      pm1 

                                                                                             

17:31 Order name: IV Saline Lock; Complete Time: 20:00                                        pm1 

                                                                                             

17:31 Order name: Labs collected and sent; Complete Time: 20:00                               pm1 

                                                                                             

17:31 Order name: Urine Dipstick-Ancillary (obtain specimen); Complete Time: 20:00            pm1 

                                                                                                  

Administered Medications:                                                                         

20:00 Drug: NS 0.9% IV 1000 ml Route: IV; Rate: 1 bolus; Site: left antecubital;              3 

21:00 Follow up: IV Status: Completed infusion; IV Intake: 1000ml                             eh3 

23:00 Drug: AZITHromycin PO 1 grams Route: PO;                                                3 

23:31 Follow up: Response: No adverse reaction                                                eh3 

23:00 Drug: Rocephin IV 1 grams Route: IV; Rate: calculated rate; Site: left antecubital;     3 

23:31 Follow up: Response: No adverse reaction; IV Status: Completed infusion; IV Intake: 10tkju0 

                                                                                                  

                                                                                                  

Disposition:                                                                                      

03/10                                                                                             

07:02 Co-signature as Attending Physician, Dewey Cárdenas MD I reviewed the patient's care       rn  

      provided by the Advanced Practice Provider and agree with the diagnosis and treatment       

      plan.                                                                                       

                                                                                                  

Disposition Summary:                                                                              

23 23:12                                                                                    

Discharge Ordered                                                                                 

      Location: Home                                                                          cp  

      Problem: new                                                                            cp  

      Symptoms: have improved                                                                 cp  

      Condition: Stable                                                                       cp  

      Diagnosis                                                                                   

        - Hematuria, unspecified                                                              cp  

        - Dysuria                                                                             cp  

      Followup:                                                                               cp  

        - With: Jef Serna MD                                                                

        - When: 1 week                                                                             

        - Reason: symptoms continue                                                                

      Discharge Instructions:                                                                     

        - Discharge Summary Sheet                                                             cp  

        - Dysuria                                                                             cp  

        - Hematuria, Adult                                                                    cp  

      Forms:                                                                                      

        - Medication Reconciliation Form                                                      cp  

        - Thank You Letter                                                                    cp  

        - Antibiotic Education                                                                cp  

        - Prescription Opioid Use                                                             cp  

      Prescriptions:                                                                              

        - Doxycycline Hyclate 100 mg Oral Tablet                                                   

            - take 1 tablet by ORAL route every 12 hours; 20 tablet; Refills: 0, Product      cp  

      Selection Permitted                                                                         

Signatures:                                                                                       

Dispatcher [employer]                           EDDewey Fry MD MD   rn                                                   

Tacos Green PA                         PA   cp                                                   

Alfonso Baldwin, YASMANI                    NP   pm1                                                  

Marisol Gallegos RN RN                                                      

Venice Becerril RN                          RN   eh3                                                  

                                                                                                  

Corrections: (The following items were deleted from the chart)                                    

                                                                                             

22:18 22:18 Normal except: BUN 22. cp                                                         cp  

23:11 17:31 33-year-old male presents ago with complaints of blood in urine. Patient reports  cp  

      a few drops of bright red blood in the front of the toilet when he was having a bowel       

      movement. Negative for any blood from rectum or stool. Patient noticed some blood in        

      his underwear afterwards and also had some dried blood on the tip of his meatus.            

      Patient reports last sexual activity was masturbation about 3 days ago. Negative for        

      penile discharge, genital pain, abdominal pain, flank pain. Patient reports history of      

      kidney stones about 1 year ago. pm1                                                         

                                                                                                  

**************************************************************************************************